# Patient Record
Sex: MALE | Race: WHITE | NOT HISPANIC OR LATINO | Employment: FULL TIME | URBAN - METROPOLITAN AREA
[De-identification: names, ages, dates, MRNs, and addresses within clinical notes are randomized per-mention and may not be internally consistent; named-entity substitution may affect disease eponyms.]

---

## 2017-10-06 ENCOUNTER — HOSPITAL ENCOUNTER (EMERGENCY)
Facility: HOSPITAL | Age: 37
Discharge: HOME | End: 2017-10-06
Attending: EMERGENCY MEDICINE | Admitting: EMERGENCY MEDICINE
Payer: COMMERCIAL

## 2017-10-06 VITALS
BODY MASS INDEX: 25.06 KG/M2 | WEIGHT: 160 LBS | RESPIRATION RATE: 18 BRPM | SYSTOLIC BLOOD PRESSURE: 164 MMHG | TEMPERATURE: 97.7 F | DIASTOLIC BLOOD PRESSURE: 97 MMHG | HEART RATE: 77 BPM | OXYGEN SATURATION: 96 %

## 2017-10-06 DIAGNOSIS — K04.7 DENTAL ABSCESS: Primary | ICD-10-CM

## 2017-10-06 PROCEDURE — 99282 EMERGENCY DEPT VISIT SF MDM: CPT

## 2017-10-06 RX ORDER — AMOXICILLIN AND CLAVULANATE POTASSIUM 875; 125 MG/1; MG/1
1 TABLET, FILM COATED ORAL EVERY 12 HOURS
Qty: 20 TABLET | Refills: 0 | Status: SHIPPED | OUTPATIENT
Start: 2017-10-06 | End: 2017-10-16

## 2017-10-06 RX ORDER — LIDOCAINE HYDROCHLORIDE 40 MG/ML
5 SOLUTION TOPICAL ONCE
Status: DISCONTINUED | OUTPATIENT
Start: 2017-10-06 | End: 2017-10-06

## 2017-10-06 RX ORDER — IBUPROFEN 600 MG/1
600 TABLET ORAL EVERY 6 HOURS PRN
Qty: 30 TABLET | Refills: 0 | Status: SHIPPED | OUTPATIENT
Start: 2017-10-06

## 2017-10-06 RX ORDER — FLUOXETINE 20 MG/1
20 TABLET, FILM COATED ORAL DAILY
COMMUNITY

## 2017-10-06 RX ORDER — AMOXICILLIN AND CLAVULANATE POTASSIUM 875; 125 MG/1; MG/1
1 TABLET, FILM COATED ORAL ONCE
Status: COMPLETED | OUTPATIENT
Start: 2017-10-06 | End: 2017-10-06

## 2017-10-06 RX ADMIN — LIDOCAINE HYDROCHLORIDE 15 ML: 20 SOLUTION ORAL; TOPICAL at 02:32

## 2017-10-06 RX ADMIN — AMOXICILLIN AND CLAVULANATE POTASSIUM 1 TABLET: 875; 125 TABLET, FILM COATED ORAL at 02:32

## 2017-10-06 NOTE — DISCHARGE INSTRUCTIONS
Dental Abscess   WHAT YOU NEED TO KNOW:   A dental abscess is a collection of pus in or around a tooth  A dental abscess is caused by bacteria  The bacteria usually enter the tooth when the enamel (outer part of the tooth) is damaged by tooth decay  Bacteria may also enter the tooth through a break or chip in the tooth, or a cut in the gum  Food particles that are stuck between the teeth for a long time may also lead to an abscess  DISCHARGE INSTRUCTIONS:   Return to the emergency department if:   · You have severe pain  · You have trouble breathing because of pain or swelling  Contact your healthcare provider if:   · Your symptoms get worse, even after treatment  · Your mouth is bleeding  · You cannot eat or drink because of pain or swelling  · Your abscess returns  · You have an injury that causes a crack in your tooth  · You have questions or concerns about your condition or care  Medicines: You may  need any of the following:  · Antibiotics  help treat a bacterial infection  · NSAIDs , such as ibuprofen, help decrease swelling, pain, and fever  This medicine is available with or without a doctor's order  NSAIDs can cause stomach bleeding or kidney problems in certain people  If you take blood thinner medicine, always ask your healthcare provider if NSAIDs are safe for you  Always read the medicine label and follow directions  · Acetaminophen  decreases pain and fever  It is available without a doctor's order  Ask how much to take and how often to take it  Follow directions  Read the labels of all other medicines you are using to see if they also contain acetaminophen, or ask your doctor or pharmacist  Acetaminophen can cause liver damage if not taken correctly  Do not use more than 4 grams (4,000 milligrams) total of acetaminophen in one day  · Prescription pain medicine  may be given  Ask your healthcare provider how to take this medicine safely   Some prescription pain medicines contain acetaminophen  Do not take other medicines that contain acetaminophen without talking to your healthcare provider  Too much acetaminophen may cause liver damage  Prescription pain medicine may cause constipation  Ask your healthcare provider how to prevent or treat constipation  · Take your medicine as directed  Contact your healthcare provider if you think your medicine is not helping or if you have side effects  Tell him of her if you are allergic to any medicine  Keep a list of the medicines, vitamins, and herbs you take  Include the amounts, and when and why you take them  Bring the list or the pill bottles to follow-up visits  Carry your medicine list with you in case of an emergency  Self-care:   · Rinse your mouth every 2 hours with salt water  This will help keep the area clean  · Gently brush your teeth twice a day with a soft tooth brush  This will help keep the area clean  · Eat soft foods as directed  Soft foods may cause less pain  Examples include applesauce, yogurt, and cooked pasta  Ask your healthcare provider how long to follow this instruction  · Apply a warm compress to your tooth or gum  Use a cotton ball or gauze soaked in warm water  Remove the compress in 10 minutes or when it becomes cool  Repeat 3 times a day  Prevent another abscess:   · Brush your teeth at least 2 times a day with fluoride toothpaste  · Use dental floss to clean between your teeth at least once a day  · Rinse your mouth with water or mouthwash after meals and snacks  · Chew sugarless gum after meals and snacks  · Limit foods that are sticky and high in sugar such as raisons  Also limit drinks high in sugar, such as soda  · See your dentist every 6 months for dental cleanings and oral exams  Follow up with your healthcare provider in 24 hours: Your healthcare provider will need to check your teeth and gums   Write down your questions so you remember to ask them during your visits  © 2017 [employer] Information is for End User's use only and may not be sold, redistributed or otherwise used for commercial purposes  All illustrations and images included in CareNotes® are the copyrighted property of [employer]  or [employer]  The above information is an  only  It is not intended as medical advice for individual conditions or treatments  Talk to your doctor, nurse or pharmacist before following any medical regimen to see if it is safe and effective for you

## 2017-10-06 NOTE — ED PROVIDER NOTES
History  Chief Complaint   Patient presents with    Dental Pain     C/o Of pain on the R side of his lower jaw  Patient states that his pain starts in one of his back teeth and radiates down and back into his jaw  He states that the pain is worse in his jaw  Pt with c/o sudden onset of facial swelling and dental pain  He states that he took 600mg of advil with minimal relief  He denies trauma            Prior to Admission Medications   Prescriptions Last Dose Informant Patient Reported? Taking? FLUoxetine (PROzac) 20 MG tablet 10/5/2017 at Unknown time  Yes Yes   Sig: Take 20 mg by mouth daily      Facility-Administered Medications: None       Past Medical History:   Diagnosis Date    Intermittent palpitations        History reviewed  No pertinent surgical history  History reviewed  No pertinent family history  I have reviewed and agree with the history as documented  Social History   Substance Use Topics    Smoking status: Current Every Day Smoker     Packs/day: 1 00     Types: Cigarettes    Smokeless tobacco: Never Used    Alcohol use Yes      Comment: occ        Review of Systems   Constitutional: Negative for chills and fever  HENT: Positive for dental problem and facial swelling  Negative for drooling  All other systems reviewed and are negative  Physical Exam  ED Triage Vitals [10/06/17 0145]   Temperature Pulse Respirations Blood Pressure SpO2   97 7 °F (36 5 °C) 78 20 (!) 162/101 97 %      Temp Source Heart Rate Source Patient Position - Orthostatic VS BP Location FiO2 (%)   Oral Monitor Lying Left arm --      Pain Score       9           Physical Exam   Constitutional: He appears well-developed and well-nourished  HENT:   Head: Normocephalic and atraumatic  Eyes: EOM are normal  Pupils are equal, round, and reactive to light  Skin: No rash noted  No erythema  Nursing note and vitals reviewed        ED Medications  Medications   amoxicillin-clavulanate (AUGMENTIN) 875-125 mg per tablet 1 tablet (1 tablet Oral Given 10/6/17 0232)   lidocaine viscous (XYLOCAINE) 2 % mucosal solution 15 mL (15 mL Swish & Spit Given 10/6/17 0232)       Diagnostic Studies  Labs Reviewed - No data to display    No orders to display       Procedures  Incision/Drainage  Date/Time: 10/6/2017 3:01 AM  Performed by: Earle Kanner by: Guerda Hou     Patient location:  ED and bedside  Other Assisting Provider: Yes (comment)    Consent:     Consent obtained:  Verbal    Risks discussed:  Bleeding, incomplete drainage, pain, infection and damage to other organs    Alternatives discussed:  No treatment  Universal protocol:     Procedure explained and questions answered to patient or proxy's satisfaction: yes      Relevant documents present and verified: no      Test results available and properly labeled: no      Imaging studies available: no      Required blood products, implants, devices, and special equipment available: no      Site/side marked: yes      Immediately prior to procedure a time out was called: yes      Patient identity confirmed:  Verbally with patient  Location:     Type:  Cyst    Location:  Mouth  Anesthesia (see MAR for exact dosages): Anesthesia method:  Topical application    Topical anesthetic:  Lidocaine gel  Procedure details:     Complexity:  Intermediate    Needle aspiration: yes      Needle size:  18 G    Incision types:  Punture    Approach:  Puncture    Incision depth:  Submucosal    Drainage:  Bloody    Drainage amount: Moderate    Wound treatment:  Wound left open  Post-procedure details:     Patient tolerance of procedure: Tolerated well, no immediate complications          Phone Contacts  ED Phone Contact    ED Course  ED Course                                MDM  Number of Diagnoses or Management Options  Dental abscess:   Diagnosis management comments: Incision and drainage yielded bloody d/c  Pt will f/u with a dentist as an outpt   Will start pt on Augmentin  CritCare Time    Disposition  Final diagnoses:   Dental abscess     ED Disposition     ED Disposition Condition Comment    Discharge  Timi Mathias discharge to home/self care  Condition at discharge: Good        Follow-up Information     Follow up With Specialties Details Why Contact Info    Jackjessie Estrella PA-C Physician Assistant Call today  Darrin Murdock [phone] [facility]   [phone]          Discharge Medication List as of 10/6/2017  3:06 AM      START taking these medications    Details   amoxicillin-clavulanate (AUGMENTIN) 875-125 mg per tablet Take 1 tablet by mouth every 12 (twelve) hours for 10 days, Starting Fri 10/6/2017, Until Mon 10/16/2017, Print      ibuprofen (MOTRIN) 600 mg tablet Take 1 tablet by mouth every 6 (six) hours as needed for mild pain or moderate pain, Starting Fri 10/6/2017, Print         CONTINUE these medications which have NOT CHANGED    Details   FLUoxetine (PROzac) 20 MG tablet Take 20 mg by mouth daily, Historical Med           No discharge procedures on file      ED Provider  Electronically Signed by       Zoe Jo DO  10/06/17 5808

## 2018-03-29 ENCOUNTER — OFFICE VISIT (OUTPATIENT)
Dept: FAMILY MEDICINE CLINIC | Facility: CLINIC | Age: 38
End: 2018-03-29
Payer: COMMERCIAL

## 2018-03-29 VITALS
BODY MASS INDEX: 29.03 KG/M2 | TEMPERATURE: 98.6 F | HEIGHT: 67 IN | DIASTOLIC BLOOD PRESSURE: 70 MMHG | SYSTOLIC BLOOD PRESSURE: 120 MMHG | WEIGHT: 185 LBS | HEART RATE: 88 BPM | RESPIRATION RATE: 18 BRPM

## 2018-03-29 DIAGNOSIS — Z77.098 PESTICIDE EXPOSURE: ICD-10-CM

## 2018-03-29 DIAGNOSIS — M25.50 MULTIPLE JOINT PAIN: ICD-10-CM

## 2018-03-29 DIAGNOSIS — R00.2 HEART PALPITATIONS: ICD-10-CM

## 2018-03-29 DIAGNOSIS — R53.82 CHRONIC FATIGUE: Primary | ICD-10-CM

## 2018-03-29 PROCEDURE — 99204 OFFICE O/P NEW MOD 45 MIN: CPT | Performed by: NURSE PRACTITIONER

## 2018-03-29 NOTE — PATIENT INSTRUCTIONS
Complete blood work and schedule with Cardiology  Return to the office approximately two weeks after blood work is complete to go over those results and start planning interventions as needed

## 2018-04-27 ENCOUNTER — OFFICE VISIT (OUTPATIENT)
Dept: CARDIOLOGY CLINIC | Facility: CLINIC | Age: 38
End: 2018-04-27
Payer: COMMERCIAL

## 2018-04-27 VITALS
OXYGEN SATURATION: 98 % | SYSTOLIC BLOOD PRESSURE: 122 MMHG | HEIGHT: 67 IN | DIASTOLIC BLOOD PRESSURE: 70 MMHG | HEART RATE: 87 BPM | WEIGHT: 185 LBS | BODY MASS INDEX: 29.03 KG/M2

## 2018-04-27 DIAGNOSIS — R53.82 CHRONIC FATIGUE: ICD-10-CM

## 2018-04-27 DIAGNOSIS — R00.2 HEART PALPITATIONS: Primary | ICD-10-CM

## 2018-04-27 DIAGNOSIS — R07.89 OTHER CHEST PAIN: ICD-10-CM

## 2018-04-27 PROCEDURE — 93000 ELECTROCARDIOGRAM COMPLETE: CPT | Performed by: INTERNAL MEDICINE

## 2018-04-27 PROCEDURE — 99244 OFF/OP CNSLTJ NEW/EST MOD 40: CPT | Performed by: INTERNAL MEDICINE

## 2018-04-27 RX ORDER — PROPRANOLOL HYDROCHLORIDE 20 MG/1
20 TABLET ORAL 2 TIMES DAILY PRN
COMMUNITY

## 2018-04-27 NOTE — PROGRESS NOTES
Subjective:      Patient is a 45 y o  male who presents for evaluation of palpitations  Symptoms include fast heart rate  Onset was 2 years ago, and have been unchanged since that time  He has the palpitations with exertion and they improve with rest after a few minutes  Associated symptoms include: chest pain and shortness of breath  The patient denies dizziness and syncope  The patient denies a past history of atrial fibrillation, CAD, DM, HTN and hyperlipidemia  He had previous workup with holter monitor at [address] [place] [address] and was presribed PRN propanolol afterwards  He does not take it  He had a stress test and echocardiogram 10 years ago which he said was normal but was told his heart was enlarged  The following portions of the patient's history were reviewed and updated as appropriate: allergies, current medications, past family history, past medical history, past social history, past surgical history and problem list     Review of Systems  Pertinent items are noted in HPI  Objective:      Physical Exam  /70 (BP Location: Left arm, Patient Position: Sitting, Cuff Size: Standard)   Pulse 87 Comment: apical  Ht 5' 7" (1 702 m)   Wt 83 9 kg (185 lb)   SpO2 98% Comment: RA  BMI 28 98 kg/m²   General appearance: alert and oriented, in no acute distress  Head: Normocephalic, without obvious abnormality, atraumatic  Eyes: conjunctivae/corneas clear  PERRL, EOM's intact  Fundi benign  Nose: Nares normal  Septum midline  Mucosa normal  No drainage or sinus tenderness    Neck: no adenopathy, no carotid bruit, no JVD, supple, symmetrical, trachea midline and thyroid not enlarged, symmetric, no tenderness/mass/nodules  Lungs: clear to auscultation bilaterally  Heart: regular rate and rhythm, S1, S2 normal, no murmur, click, rub or gallop  Abdomen: soft, non-tender; bowel sounds normal; no masses,  no organomegaly  Extremities: extremities normal, warm and well-perfused; no cyanosis, clubbing, or edema  Skin: Skin color, texture, turgor normal  No rashes or lesions    Cardiographics  ECG: normal sinus rhythm and non-specific T wave abnormalities   Lab Review   Lab Results   Component Value Date     05/16/2016    K 3 5 05/16/2016     05/16/2016    CO2 26 05/16/2016    BUN 8 05/16/2016    CREATININE 1 10 05/16/2016    GLUCOSE 81 05/16/2016    CALCIUM 8 5 05/16/2016     Lab Results   Component Value Date    WBC 5 90 05/16/2016    HGB 14 1 05/16/2016    HCT 41 7 (L) 05/16/2016    MCV 93 05/16/2016     05/16/2016     No results found for: CHOL, TRIG, HDL, LDLDIRECT      Assessment:          1  Heart palpitations    2  Chronic fatigue    3  Other chest pain         Plan:     - Mr Malvin Cohen will undergo further evaluation of his palpitations with treadmill stress test as his symptoms occur with exertion  - 2d echocardiogram will also be obtained to rule out any structural heart disease  - blood work was ordered by his PCP

## 2018-04-27 NOTE — LETTER
April 27, 2018     Maren Martinez, [address] [place] [address]   [id number]    Patient: Pranay Macias   YOB: 1980   Date of Visit: 4/27/2018       Dear Dr Ronel Sutton: Thank you for referring Pranay Macias to me for evaluation  Below are my notes for this consultation  If you have questions, please do not hesitate to call me  I look forward to following your patient along with you  Sincerely,        Brie Evangelista DO        CC: No Recipients  John Angelo DO  4/27/2018  4:52 PM  Sign at close encounter  Subjective:      Patient is a 45 y o  male who presents for evaluation of palpitations  Symptoms include fast heart rate  Onset was 2 years ago, and have been unchanged since that time  He has the palpitations with exertion and they improve with rest after a few minutes  Associated symptoms include: chest pain and shortness of breath  The patient denies dizziness and syncope  The patient denies a past history of atrial fibrillation, CAD, DM, HTN and hyperlipidemia  He had previous workup with holter monitor at [address] [place] [address] and was presribed PRN propanolol afterwards  He does not take it  He had a stress test and echocardiogram 10 years ago which he said was normal but was told his heart was enlarged  The following portions of the patient's history were reviewed and updated as appropriate: allergies, current medications, past family history, past medical history, past social history, past surgical history and problem list     Review of Systems  Pertinent items are noted in HPI  Objective:      Physical Exam  /70 (BP Location: Left arm, Patient Position: Sitting, Cuff Size: Standard)   Pulse 87 Comment: apical  Ht 5' 7" (1 702 m)   Wt 83 9 kg (185 lb)   SpO2 98% Comment: RA  BMI 28 98 kg/m²    General appearance: alert and oriented, in no acute distress  Head: Normocephalic, without obvious abnormality, atraumatic  Eyes: conjunctivae/corneas clear  PERRL, EOM's intact  Fundi benign  Nose: Nares normal  Septum midline  Mucosa normal  No drainage or sinus tenderness  Neck: no adenopathy, no carotid bruit, no JVD, supple, symmetrical, trachea midline and thyroid not enlarged, symmetric, no tenderness/mass/nodules  Lungs: clear to auscultation bilaterally  Heart: regular rate and rhythm, S1, S2 normal, no murmur, click, rub or gallop  Abdomen: soft, non-tender; bowel sounds normal; no masses,  no organomegaly  Extremities: extremities normal, warm and well-perfused; no cyanosis, clubbing, or edema  Skin: Skin color, texture, turgor normal  No rashes or lesions    Cardiographics  ECG: normal sinus rhythm and non-specific T wave abnormalities   Lab Review   Lab Results   Component Value Date     05/16/2016    K 3 5 05/16/2016     05/16/2016    CO2 26 05/16/2016    BUN 8 05/16/2016    CREATININE 1 10 05/16/2016    GLUCOSE 81 05/16/2016    CALCIUM 8 5 05/16/2016     Lab Results   Component Value Date    WBC 5 90 05/16/2016    HGB 14 1 05/16/2016    HCT 41 7 (L) 05/16/2016    MCV 93 05/16/2016     05/16/2016     No results found for: CHOL, TRIG, HDL, LDLDIRECT      Assessment:          1  Heart palpitations    2  Chronic fatigue    3  Other chest pain         Plan:     - Mr Karen Block will undergo further evaluation of his palpitations with treadmill stress test as his symptoms occur with exertion  - 2d echocardiogram will also be obtained to rule out any structural heart disease  - blood work was ordered by his PCP

## 2018-05-17 ENCOUNTER — TRANSCRIBE ORDERS (OUTPATIENT)
Dept: ADMINISTRATIVE | Facility: HOSPITAL | Age: 38
End: 2018-05-17

## 2018-05-17 ENCOUNTER — HOSPITAL ENCOUNTER (OUTPATIENT)
Dept: NON INVASIVE DIAGNOSTICS | Facility: HOSPITAL | Age: 38
Discharge: HOME | End: 2018-05-17
Attending: INTERNAL MEDICINE
Payer: COMMERCIAL

## 2018-05-17 DIAGNOSIS — R00.2 HEART PALPITATIONS: ICD-10-CM

## 2018-05-17 LAB
CHEST PAIN STATEMENT: NORMAL
MAX DIASTOLIC BP: 60 MMHG
MAX HEART RATE: 187 BPM
MAX PREDICTED HEART RATE: 182 BPM
MAX. SYSTOLIC BP: 142 MMHG
PROTOCOL NAME: NORMAL
TARGET HR FORMULA: NORMAL
TEST INDICATION: NORMAL
TIME IN EXERCISE PHASE: NORMAL

## 2018-05-17 PROCEDURE — 93017 CV STRESS TEST TRACING ONLY: CPT

## 2018-05-17 PROCEDURE — 93306 TTE W/DOPPLER COMPLETE: CPT

## 2018-05-18 PROCEDURE — 93306 TTE W/DOPPLER COMPLETE: CPT | Performed by: INTERNAL MEDICINE

## 2018-05-18 PROCEDURE — 93018 CV STRESS TEST I&R ONLY: CPT | Performed by: INTERNAL MEDICINE

## 2018-05-18 PROCEDURE — 93016 CV STRESS TEST SUPVJ ONLY: CPT | Performed by: INTERNAL MEDICINE
